# Patient Record
Sex: FEMALE | Race: WHITE | Employment: UNEMPLOYED | URBAN - METROPOLITAN AREA
[De-identification: names, ages, dates, MRNs, and addresses within clinical notes are randomized per-mention and may not be internally consistent; named-entity substitution may affect disease eponyms.]

---

## 2024-11-10 ENCOUNTER — HOSPITAL ENCOUNTER (EMERGENCY)
Facility: HOSPITAL | Age: 64
Discharge: HOME | End: 2024-11-10
Attending: EMERGENCY MEDICINE

## 2024-11-10 VITALS
HEART RATE: 62 BPM | WEIGHT: 112 LBS | OXYGEN SATURATION: 99 % | SYSTOLIC BLOOD PRESSURE: 118 MMHG | DIASTOLIC BLOOD PRESSURE: 68 MMHG | TEMPERATURE: 98 F | RESPIRATION RATE: 20 BRPM

## 2024-11-10 DIAGNOSIS — N30.90 CYSTITIS: ICD-10-CM

## 2024-11-10 DIAGNOSIS — F32.A DEPRESSION, UNSPECIFIED DEPRESSION TYPE: Primary | ICD-10-CM

## 2024-11-10 LAB
ALBUMIN SERPL-MCNC: 4.2 G/DL (ref 3.2–4.8)
ALBUMIN/GLOB SERPL: 1.6 {RATIO} (ref 1–2)
ALP LIVER SERPL-CCNC: 102 U/L
ALT SERPL-CCNC: 18 U/L
AMPHET UR QL SCN: NEGATIVE
ANION GAP SERPL CALC-SCNC: 4 MMOL/L (ref 0–18)
APAP SERPL-MCNC: <2 UG/ML (ref 10–20)
AST SERPL-CCNC: 23 U/L (ref ?–34)
BASOPHILS # BLD AUTO: 0.03 X10(3) UL (ref 0–0.2)
BASOPHILS NFR BLD AUTO: 0.6 %
BENZODIAZ UR QL SCN: NEGATIVE
BILIRUB SERPL-MCNC: 0.5 MG/DL (ref 0.2–1.1)
BILIRUB UR QL STRIP.AUTO: NEGATIVE
BUN BLD-MCNC: 17 MG/DL (ref 9–23)
CALCIUM BLD-MCNC: 9.4 MG/DL (ref 8.7–10.4)
CANNABINOIDS UR QL SCN: NEGATIVE
CHLORIDE SERPL-SCNC: 107 MMOL/L (ref 98–112)
CLARITY UR REFRACT.AUTO: CLEAR
CO2 SERPL-SCNC: 29 MMOL/L (ref 21–32)
COCAINE UR QL: NEGATIVE
CREAT BLD-MCNC: 0.75 MG/DL
CREAT UR-SCNC: 131.5 MG/DL
EGFRCR SERPLBLD CKD-EPI 2021: 91 ML/MIN/1.73M2 (ref 60–?)
EOSINOPHIL # BLD AUTO: 0.08 X10(3) UL (ref 0–0.7)
EOSINOPHIL NFR BLD AUTO: 1.5 %
ERYTHROCYTE [DISTWIDTH] IN BLOOD BY AUTOMATED COUNT: 12.6 %
ETHANOL SERPL-MCNC: <3 MG/DL (ref ?–3)
FENTANYL UR QL SCN: NEGATIVE
GLOBULIN PLAS-MCNC: 2.6 G/DL (ref 2–3.5)
GLUCOSE BLD-MCNC: 189 MG/DL (ref 70–99)
GLUCOSE BLD-MCNC: 270 MG/DL (ref 70–99)
GLUCOSE UR STRIP.AUTO-MCNC: >1000 MG/DL
HCT VFR BLD AUTO: 37.9 %
HGB BLD-MCNC: 13 G/DL
IMM GRANULOCYTES # BLD AUTO: 0.01 X10(3) UL (ref 0–1)
IMM GRANULOCYTES NFR BLD: 0.2 %
KETONES UR STRIP.AUTO-MCNC: NEGATIVE MG/DL
LEUKOCYTE ESTERASE UR QL STRIP.AUTO: 25
LYMPHOCYTES # BLD AUTO: 1.32 X10(3) UL (ref 1–4)
LYMPHOCYTES NFR BLD AUTO: 24.2 %
MCH RBC QN AUTO: 31.7 PG (ref 26–34)
MCHC RBC AUTO-ENTMCNC: 34.3 G/DL (ref 31–37)
MCV RBC AUTO: 92.4 FL
MDMA UR QL SCN: NEGATIVE
MONOCYTES # BLD AUTO: 0.41 X10(3) UL (ref 0.1–1)
MONOCYTES NFR BLD AUTO: 7.5 %
NEUTROPHILS # BLD AUTO: 3.6 X10 (3) UL (ref 1.5–7.7)
NEUTROPHILS # BLD AUTO: 3.6 X10(3) UL (ref 1.5–7.7)
NEUTROPHILS NFR BLD AUTO: 66 %
NITRITE UR QL STRIP.AUTO: NEGATIVE
OPIATES UR QL SCN: NEGATIVE
OSMOLALITY SERPL CALC.SUM OF ELEC: 301 MOSM/KG (ref 275–295)
OXYCODONE UR QL SCN: NEGATIVE
PH UR STRIP.AUTO: 5.5 [PH] (ref 5–8)
PLATELET # BLD AUTO: 210 10(3)UL (ref 150–450)
POTASSIUM SERPL-SCNC: 3.9 MMOL/L (ref 3.5–5.1)
PROT SERPL-MCNC: 6.8 G/DL (ref 5.7–8.2)
RBC # BLD AUTO: 4.1 X10(6)UL
RBC UR QL AUTO: NEGATIVE
SALICYLATES SERPL-MCNC: <3 MG/DL (ref 3–20)
SODIUM SERPL-SCNC: 140 MMOL/L (ref 136–145)
SP GR UR STRIP.AUTO: >1.03 (ref 1–1.03)
TROPONIN I SERPL HS-MCNC: <3 NG/L
UROBILINOGEN UR STRIP.AUTO-MCNC: NORMAL MG/DL
WBC # BLD AUTO: 5.5 X10(3) UL (ref 4–11)

## 2024-11-10 PROCEDURE — 87086 URINE CULTURE/COLONY COUNT: CPT | Performed by: EMERGENCY MEDICINE

## 2024-11-10 PROCEDURE — 82962 GLUCOSE BLOOD TEST: CPT

## 2024-11-10 PROCEDURE — 96361 HYDRATE IV INFUSION ADD-ON: CPT

## 2024-11-10 PROCEDURE — 96360 HYDRATION IV INFUSION INIT: CPT

## 2024-11-10 PROCEDURE — 93010 ELECTROCARDIOGRAM REPORT: CPT

## 2024-11-10 PROCEDURE — 84484 ASSAY OF TROPONIN QUANT: CPT | Performed by: EMERGENCY MEDICINE

## 2024-11-10 PROCEDURE — 82077 ASSAY SPEC XCP UR&BREATH IA: CPT | Performed by: EMERGENCY MEDICINE

## 2024-11-10 PROCEDURE — 81001 URINALYSIS AUTO W/SCOPE: CPT | Performed by: EMERGENCY MEDICINE

## 2024-11-10 PROCEDURE — 93005 ELECTROCARDIOGRAM TRACING: CPT

## 2024-11-10 PROCEDURE — 80307 DRUG TEST PRSMV CHEM ANLYZR: CPT | Performed by: EMERGENCY MEDICINE

## 2024-11-10 PROCEDURE — 99285 EMERGENCY DEPT VISIT HI MDM: CPT

## 2024-11-10 PROCEDURE — 90791 PSYCH DIAGNOSTIC EVALUATION: CPT

## 2024-11-10 PROCEDURE — 85025 COMPLETE CBC W/AUTO DIFF WBC: CPT | Performed by: EMERGENCY MEDICINE

## 2024-11-10 PROCEDURE — 80143 DRUG ASSAY ACETAMINOPHEN: CPT | Performed by: EMERGENCY MEDICINE

## 2024-11-10 PROCEDURE — 80179 DRUG ASSAY SALICYLATE: CPT | Performed by: EMERGENCY MEDICINE

## 2024-11-10 PROCEDURE — 80053 COMPREHEN METABOLIC PANEL: CPT | Performed by: EMERGENCY MEDICINE

## 2024-11-10 RX ORDER — NITROFURANTOIN 25; 75 MG/1; MG/1
100 CAPSULE ORAL 2 TIMES DAILY
Qty: 10 CAPSULE | Refills: 0 | Status: SHIPPED | OUTPATIENT
Start: 2024-11-10 | End: 2024-11-15

## 2024-11-10 RX ORDER — HYDROXYZINE HYDROCHLORIDE 50 MG/1
50 TABLET, FILM COATED ORAL NIGHTLY PRN
Qty: 20 TABLET | Refills: 0 | Status: SHIPPED | OUTPATIENT
Start: 2024-11-10 | End: 2024-12-10

## 2024-11-10 NOTE — ED PROVIDER NOTES
Patient Seen in: [facility] Emergency Department      History     Chief Complaint   Patient presents with    Eval-P    Fatigue    Dizziness     Stated Complaint: eval p    Subjective:   HPI      63-year-old female with a past medical history as below including hypertension, diabetes, anxiety and depression brought by daughter for evaluation for concerns about depression.  Patient Malagasy-speaking with video  used throughout the encounter.  Patient is a poor historian stating she feels fine.  Daughter assists with history and states that she has been very withdrawn for the past few weeks and does not want to talk to anybody.  She states she has not been eating and drinking much and has not been sleeping well.  Daughter states patient has previous suicide attempts when she has behaved like this.  Daughter states patient has tried to overdose on her diabetes pills in the past.  Patient denies suicidal ideations.  She denies chest pain or shortness of breath.  Denies abdominal pain, vomiting or diarrhea.  Denies urinary symptoms.  Denies headache or visual changes.    Objective:     Past Medical History:    Anxiety    Depression    Diabetes (HCC)    Essential hypertension              No pertinent past surgical history.              No pertinent social history.                Physical Exam     ED Triage Vitals [11/10/24 1538]   /70   Pulse 64   Resp 20   Temp 98.3 °F (36.8 °C)   Temp src Oral   SpO2 97 %   O2 Device None (Room air)       Current Vitals:   Vital Signs  BP: 109/70  Pulse: 64  Resp: 20  Temp: 98.3 °F (36.8 °C)  Temp src: Oral    Oxygen Therapy  SpO2: 97 %  O2 Device: None (Room air)        Physical Exam  Vitals and nursing note reviewed.   Constitutional:       Appearance: She is well-developed.   HENT:      Head: Normocephalic and atraumatic.      Mouth/Throat:      Mouth: Mucous membranes are moist.   Eyes:      General: No scleral icterus.  Cardiovascular:      Rate and Rhythm:  Normal rate and regular rhythm.   Pulmonary:      Effort: Pulmonary effort is normal.      Breath sounds: Normal breath sounds.   Abdominal:      Palpations: Abdomen is soft.      Tenderness: There is no abdominal tenderness.   Skin:     General: Skin is warm and dry.   Neurological:      General: No focal deficit present.      Mental Status: She is alert and oriented to person, place, and time.      Cranial Nerves: No cranial nerve deficit.      Motor: No weakness.   Psychiatric:         Mood and Affect: Mood is depressed. Affect is flat.         Behavior: Behavior is cooperative.           ED Course     Labs Reviewed   COMP METABOLIC PANEL (14) - Abnormal; Notable for the following components:       Result Value    Glucose 270 (*)     Calculated Osmolality 301 (*)     All other components within normal limits   URINALYSIS WITH CULTURE REFLEX - Abnormal; Notable for the following components:    Spec Gravity >1.030 (*)     Glucose Urine >1000 (*)     Protein Urine Trace (*)     Leukocyte Esterase Urine 25 (*)     WBC Urine 6-10 (*)     RBC Urine 3-5 (*)     Squamous Epi. Cells Few (*)     All other components within normal limits   SALICYLATE, SERUM - Abnormal; Notable for the following components:    Salicylate <3.0 (*)     All other components within normal limits   ACETAMINOPHEN (TYLENOL), S - Abnormal; Notable for the following components:    Acetaminophen <2.0 (*)     All other components within normal limits   POCT GLUCOSE - Abnormal; Notable for the following components:    POC Glucose 189 (*)     All other components within normal limits   ETHYL ALCOHOL - Normal   TROPONIN I HIGH SENSITIVITY - Normal   CBC WITH DIFFERENTIAL WITH PLATELET   DRUG SCREEN 8 W/OUT CONFIRMATION, URINE    Narrative:     Results of the Urine Drug Screen should be used only for medical purposes.   RAINBOW DRAW LAVENDER   RAINBOW DRAW LIGHT GREEN   RAINBOW DRAW BLUE   URINE CULTURE, ROUTINE     EKG    Rate, intervals and axes as noted  on EKG Report.  Rate: 57  Rhythm: Sinus Rhythm  Reading: Normal sinus rhythm, no ST/T wave changes                       MDM      63-year-old female with a past medical history as below including hypertension, diabetes, anxiety and depression brought by daughter for evaluation for concerns about depression.      Differential includes but is not limited to major depression, mood disorder.  Will check labs for medical clearance patient evaluated by [facility] crisis worker.    Labs show blood sugar of 270.  UA shows mild evidence of UTI.  Patient does report some increased frequency, will treat with Keflex pending culture result.  Labs are otherwise unremarkable.    Patient was evaluated by [facility] crisis worker and patient did not meet criteria for inpatient psychiatric hospitalization.  Patient was provided resources for outpatient mental health treatment.  Patient and family are comfortable with this plan.  Return precautions discussed.    Medical Decision Making  Amount and/or Complexity of Data Reviewed  Labs: ordered. Decision-making details documented in ED Course.  ECG/medicine tests: ordered and independent interpretation performed. Decision-making details documented in ED Course.  Discussion of management or test interpretation with external provider(s): [facility] crisis worker    Risk  Prescription drug management.  Decision regarding hospitalization.        Disposition and Plan     Clinical Impression:  1. Depression, unspecified depression type    2. Cystitis         Disposition:  Discharge  11/10/2024  9:05 pm    Follow-up:  No follow-up provider specified.        Medications Prescribed:  Current Discharge Medication List        START taking these medications    Details   hydrOXYzine 50 MG Oral Tab Take 1 tablet (50 mg total) by mouth nightly as needed for Anxiety (insomnia).  Qty: 20 tablet, Refills: 0      nitrofurantoin monohydrate macro 100 MG Oral Cap Take 1 capsule (100 mg total) by mouth 2 (two) times daily for 5  days.  Qty: 10 capsule, Refills: 0                 Supplementary Documentation:

## 2024-11-10 NOTE — ED INITIAL ASSESSMENT (HPI)
Patient endorses dizziness, tremors and increased fatigue. Denies any fever. Patient is visiting daughter from [place]. Daughter was concerned because patient seems more tired so she [facility]. [facility] advised patient's daughter to bring patient in for psych eval since she has prior attempt SI attempt in the past year. CSSRS is low

## 2024-11-11 LAB
ATRIAL RATE: 57 BPM
P AXIS: 62 DEGREES
P-R INTERVAL: 174 MS
Q-T INTERVAL: 398 MS
QRS DURATION: 76 MS
QTC CALCULATION (BEZET): 387 MS
R AXIS: 17 DEGREES
T AXIS: 26 DEGREES
VENTRICULAR RATE: 57 BPM

## 2024-11-11 NOTE — DISCHARGE INSTRUCTIONS
You may locate a psychiatrist through online websites such as:  [email or web address]  [email or web address]  [email or web address]  [email or web address]  [email or web address]    Virtual applications:  talkspace  talkiatry  teledoc  doctor on demand  lina jimenez    Please also consider talking to your primary care physician, or visiting your local [facility] health department, to access mental health services such as medication management    If your symptoms worsen, please do not hesitate to call 911 in the event of an emergency, or any crisis line numbers listed below for 24/7 assistance:    -[facility] Crisis Line [phone]  -[facility] Crisis Line [phone]  -Suicide Prevention Hotline 5-582-407-TALK (5352) or (501)  -[place] Department of Health Services: Text TALK or HABLAR to 382791  or  Text HOME to 653455 to connect with a Crisis Counselor - Free 24/7 support at your fingertips.   -National suicide and crisis lifeline: When people call, text, or chat 988, they will be connected to trained counselors that are part of the existing Lifeline network. These trained counselors will listen, understand how their problems are affecting them, provide support, and connect them to resources if necessary.      [employer] Outpatient Referrals:  [facility] located at  [address] in [place]. [phone]  [facility] [address] in [place]. [phone]   [facility]: [address] in [place]. [phone]  [facility] located at [address] in [place]. [phone]  [facility] located at [address] in [place]. [phone]  [facility] located at [address] in [place]. [phone]  [facility] located at [address] in [place]. [phone]  [facility]  located at [address] in [place]. [phone]  [facility] located at [address] in [place]. [phone]  [facility] located at [address] in [place]. [phone]  [facility] located at [address] in [place]. [phone]  [facility]: [address] in [place]. [phone]  [facility]" located at  [address] in [place] [phone]  [facility] locations in [place]/ [place]. [phone]  [facility] located at [address] in [place]. [phone]  [place] [facility]: [address] in [phone]  [facility] : [address] in [place]. [phone]  [facility] in [place]. [phone]  [facility] [address] [place]. [phone]  [facility] located at [address] in [place]. [phone]

## 2024-11-11 NOTE — ED QUICK NOTES
Rounding Completed    Plan of Care reviewed. Waiting for assessment from [facility].  Elimination needs assessed.  Provided update and warm blanket.    Bed is locked and in lowest position. Call light within reach.
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
